# Patient Record
Sex: FEMALE | Race: WHITE
[De-identification: names, ages, dates, MRNs, and addresses within clinical notes are randomized per-mention and may not be internally consistent; named-entity substitution may affect disease eponyms.]

---

## 2018-12-31 ENCOUNTER — HOSPITAL ENCOUNTER (INPATIENT)
Dept: HOSPITAL 92 - ERS | Age: 78
LOS: 4 days | Discharge: NURSING, REHAB OR LONG-TERM CARE | DRG: 470 | End: 2019-01-04
Attending: SPECIALIST | Admitting: SPECIALIST
Payer: MEDICARE

## 2018-12-31 VITALS — BODY MASS INDEX: 26.4 KG/M2

## 2018-12-31 DIAGNOSIS — E83.39: ICD-10-CM

## 2018-12-31 DIAGNOSIS — Z85.038: ICD-10-CM

## 2018-12-31 DIAGNOSIS — E83.42: ICD-10-CM

## 2018-12-31 DIAGNOSIS — S72.002A: Primary | ICD-10-CM
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
## 2018-12-31 DIAGNOSIS — Z85.3: ICD-10-CM

## 2018-12-31 DIAGNOSIS — Z88.0: ICD-10-CM

## 2018-12-31 DIAGNOSIS — Z85.841: ICD-10-CM
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
## 2018-12-31 LAB
ALBUMIN SERPL BCG-MCNC: 4.1 G/DL (ref 3.4–4.8)
ALP SERPL-CCNC: 83 U/L (ref 40–150)
ALT SERPL W P-5'-P-CCNC: 36 U/L (ref 8–55)
ANION GAP SERPL CALC-SCNC: 14 MMOL/L (ref 10–20)
ANION GAP SERPL CALC-SCNC: 17 MMOL/L (ref 10–20)
APTT PPP: 28 SEC (ref 22.9–36.1)
AST SERPL-CCNC: 33 U/L (ref 5–34)
BASOPHILS # BLD AUTO: 0 THOU/UL (ref 0–0.2)
BASOPHILS # BLD AUTO: 0 THOU/UL (ref 0–0.2)
BASOPHILS NFR BLD AUTO: 0.1 % (ref 0–1)
BASOPHILS NFR BLD AUTO: 0.1 % (ref 0–1)
BILIRUB SERPL-MCNC: 0.5 MG/DL (ref 0.2–1.2)
BUN SERPL-MCNC: 17 MG/DL (ref 9.8–20.1)
BUN SERPL-MCNC: 18 MG/DL (ref 9.8–20.1)
CALCIUM SERPL-MCNC: 9.4 MG/DL (ref 7.8–10.44)
CALCIUM SERPL-MCNC: 9.7 MG/DL (ref 7.8–10.44)
CHLORIDE SERPL-SCNC: 101 MMOL/L (ref 98–107)
CHLORIDE SERPL-SCNC: 102 MMOL/L (ref 98–107)
CO2 SERPL-SCNC: 21 MMOL/L (ref 23–31)
CO2 SERPL-SCNC: 23 MMOL/L (ref 23–31)
CREAT CL PREDICTED SERPL C-G-VRATE: 0 ML/MIN (ref 70–130)
CREAT CL PREDICTED SERPL C-G-VRATE: 0 ML/MIN (ref 70–130)
EOSINOPHIL # BLD AUTO: 0 THOU/UL (ref 0–0.7)
EOSINOPHIL # BLD AUTO: 0 THOU/UL (ref 0–0.7)
EOSINOPHIL NFR BLD AUTO: 0.2 % (ref 0–10)
EOSINOPHIL NFR BLD AUTO: 0.3 % (ref 0–10)
GLOBULIN SER CALC-MCNC: 3.1 G/DL (ref 2.4–3.5)
GLUCOSE SERPL-MCNC: 106 MG/DL (ref 83–110)
GLUCOSE SERPL-MCNC: 114 MG/DL (ref 83–110)
HGB BLD-MCNC: 11.7 G/DL (ref 12–16)
HGB BLD-MCNC: 12.4 G/DL (ref 12–16)
INR PPP: 1
LYMPHOCYTES # BLD: 0.8 THOU/UL (ref 1.2–3.4)
LYMPHOCYTES # BLD: 1.1 THOU/UL (ref 1.2–3.4)
LYMPHOCYTES NFR BLD AUTO: 6.3 % (ref 21–51)
LYMPHOCYTES NFR BLD AUTO: 8.3 % (ref 21–51)
MAGNESIUM SERPL-MCNC: 2 MG/DL (ref 1.6–2.6)
MCH RBC QN AUTO: 30.3 PG (ref 27–31)
MCH RBC QN AUTO: 30.6 PG (ref 27–31)
MCV RBC AUTO: 91.5 FL (ref 78–98)
MCV RBC AUTO: 92.4 FL (ref 78–98)
MONOCYTES # BLD AUTO: 0.7 THOU/UL (ref 0.11–0.59)
MONOCYTES # BLD AUTO: 0.8 THOU/UL (ref 0.11–0.59)
MONOCYTES NFR BLD AUTO: 5.4 % (ref 0–10)
MONOCYTES NFR BLD AUTO: 6.4 % (ref 0–10)
NEUTROPHILS # BLD AUTO: 10.7 THOU/UL (ref 1.4–6.5)
NEUTROPHILS # BLD AUTO: 11.7 THOU/UL (ref 1.4–6.5)
NEUTROPHILS NFR BLD AUTO: 84.8 % (ref 42–75)
NEUTROPHILS NFR BLD AUTO: 88.1 % (ref 42–75)
PLATELET # BLD AUTO: 174 THOU/UL (ref 130–400)
PLATELET # BLD AUTO: 177 THOU/UL (ref 130–400)
POTASSIUM SERPL-SCNC: 4.3 MMOL/L (ref 3.5–5.1)
POTASSIUM SERPL-SCNC: 4.5 MMOL/L (ref 3.5–5.1)
PROTHROMBIN TIME: 13.3 SEC (ref 12–14.7)
RBC # BLD AUTO: 3.85 MILL/UL (ref 4.2–5.4)
RBC # BLD AUTO: 4.07 MILL/UL (ref 4.2–5.4)
SODIUM SERPL-SCNC: 134 MMOL/L (ref 136–145)
SODIUM SERPL-SCNC: 135 MMOL/L (ref 136–145)
SP GR UR STRIP: 1.02 (ref 1–1.04)
WBC # BLD AUTO: 12.6 THOU/UL (ref 4.8–10.8)
WBC # BLD AUTO: 13.3 THOU/UL (ref 4.8–10.8)

## 2018-12-31 PROCEDURE — 96375 TX/PRO/DX INJ NEW DRUG ADDON: CPT

## 2018-12-31 PROCEDURE — 88305 TISSUE EXAM BY PATHOLOGIST: CPT

## 2018-12-31 PROCEDURE — 36415 COLL VENOUS BLD VENIPUNCTURE: CPT

## 2018-12-31 PROCEDURE — 86900 BLOOD TYPING SEROLOGIC ABO: CPT

## 2018-12-31 PROCEDURE — 83735 ASSAY OF MAGNESIUM: CPT

## 2018-12-31 PROCEDURE — 72170 X-RAY EXAM OF PELVIS: CPT

## 2018-12-31 PROCEDURE — 96361 HYDRATE IV INFUSION ADD-ON: CPT

## 2018-12-31 PROCEDURE — 96374 THER/PROPH/DIAG INJ IV PUSH: CPT

## 2018-12-31 PROCEDURE — 85610 PROTHROMBIN TIME: CPT

## 2018-12-31 PROCEDURE — 84100 ASSAY OF PHOSPHORUS: CPT

## 2018-12-31 PROCEDURE — 80053 COMPREHEN METABOLIC PANEL: CPT

## 2018-12-31 PROCEDURE — 86901 BLOOD TYPING SEROLOGIC RH(D): CPT

## 2018-12-31 PROCEDURE — 85730 THROMBOPLASTIN TIME PARTIAL: CPT

## 2018-12-31 PROCEDURE — 81003 URINALYSIS AUTO W/O SCOPE: CPT

## 2018-12-31 PROCEDURE — G0390 TRAUMA RESPONS W/HOSP CRITI: HCPCS

## 2018-12-31 PROCEDURE — 85025 COMPLETE CBC W/AUTO DIFF WBC: CPT

## 2018-12-31 PROCEDURE — 80048 BASIC METABOLIC PNL TOTAL CA: CPT

## 2018-12-31 PROCEDURE — 93005 ELECTROCARDIOGRAM TRACING: CPT

## 2018-12-31 PROCEDURE — 88311 DECALCIFY TISSUE: CPT

## 2018-12-31 PROCEDURE — 86850 RBC ANTIBODY SCREEN: CPT

## 2018-12-31 NOTE — HP
ATTENDING SURGEON:  Dr. Atkins.



CONSULTATION:  Orthopedics, Dr. Abdalla.



HISTORY OF PRESENT ILLNESS:  The patient is a 78-year-old  woman, who was

transferred here from our facility from [facility], where she

reportedly had a fall at the facility she lives in.  The patient denies any loss of

consciousness.  She was brought to the emergency department there and underwent

evaluation, was noted to have a left hip fracture.  At which time, she was

transferred to our facility for admission and orthopedic examination. 



ALLERGIES:  PENICILLIN.



CURRENT MEDICATIONS:  

1. Calcium.

2. Loperamide.

3. Zofran.

4. Ibuprofen.



PAST MEDICAL HISTORY:  Breast, colon, and brain cancer.



PAST SURGICAL HISTORY:  Colon resection x2, lumpectomy on the left, and

cholecystectomy. 



SOCIAL HISTORY:  The patient lives in an assisted living facility.  There is no

history of drug, tobacco, or alcohol use. 



REVIEW OF SYSTEMS:  A 10-point review of systems is negative except as otherwise

stated. 



PHYSICAL EXAMINATION:

VITAL SIGNS:  Blood pressure 152/69, heart rate 102, respirations 17, oxygen

saturation 93% on room air, and temperature is 99.3. 

GENERAL:  The patient is resting comfortably in bed.  She is awake, alert, and

oriented x2.  The patient does seem to have some confusion, but is easily redirected

regarding time and location. 

HEENT:  Normocephalic and atraumatic.  Eyes, extraocular movement intact.  PERRLA

bilaterally.  Ears are atraumatic without discharge.  Nose is atraumatic without

discharge.  Oropharynx is clear. 

NECK:  Nontender.  Trachea is in midline.  No JVD. 

CHEST:  Clear to auscultation with good inspiratory and expiratory effort. 

HEART:  Regular rate and rhythm. 

ABDOMEN:  Soft, flat, nontender with active bowel sounds. 

PELVIS:  Stable with tenderness to the left hip, consistent with her fracture. 

EXTREMITIES:   Neurovascularly intact x4.  The patient does have a small contusion

noted to her lateral left knee. 

BACK:  Atraumatic and nontender.



LABORATORY EXAMINATION:  White blood cell count 13.3, hemoglobin 12.4, hematocrit

37.6, and platelets 177.  Urinalysis is unremarkable.  The remaining labs are still

pending. 



RADIOGRAPHIC FINDINGS:  AP pelvis shows an impacted subcapital left femoral neck

fracture.  Views of the left hip again demonstrate impacted left hip fracture. 



ASSESSMENT:  

1. Status post fall.

2. Left hip fracture.

3. History of brain, colon, and breast cancer.



PLAN:  Plan will be to admit the patient to the surgical floor.  She will be made

n.p.o. after midnight for plans of surgical intervention tomorrow.  The patient will

have pain control, pulmonary toilet, gastritis and mechanical VTE prophylaxis.  The

evaluation, examination, and laboratory findings will be discussed with Dr. Atkins

after this dictation.  The patient was evaluated in the emergency department by Dr. Abdalla. 







Job ID:  [id number]

## 2018-12-31 NOTE — RAD
LEFT HIP TWO VIEWS:

 

HISTORY:

Left hip pain.

 

FINDINGS:

Impaction at a subcapital fracture with minimal superior displacement of the major distal fragment.  
Mild degenerative changes of the hip.

 

IMPRESSION:

Impacted left hip fracture.

 

POS: KI

## 2018-12-31 NOTE — RAD
AP PELVIS ONE VIEW:

 

HISTORY:

Fall.  Pelvic pain.

 

FINDINGS:

Left subcapital hip fracture again demonstrated.  Sacral ala and pelvic rings are intact.  Mild degen
erative changes of the right hip and lumbar spine.

 

IMPRESSION:

Impacted subcapital fracture, left hip.

 

POS: KI

## 2019-01-01 LAB
ANION GAP SERPL CALC-SCNC: 13 MMOL/L (ref 10–20)
BASOPHILS # BLD AUTO: 0 THOU/UL (ref 0–0.2)
BASOPHILS NFR BLD AUTO: 0.5 % (ref 0–1)
BUN SERPL-MCNC: 13 MG/DL (ref 9.8–20.1)
CALCIUM SERPL-MCNC: 9 MG/DL (ref 7.8–10.44)
CHLORIDE SERPL-SCNC: 104 MMOL/L (ref 98–107)
CO2 SERPL-SCNC: 24 MMOL/L (ref 23–31)
CREAT CL PREDICTED SERPL C-G-VRATE: 68 ML/MIN (ref 70–130)
EOSINOPHIL # BLD AUTO: 0.1 THOU/UL (ref 0–0.7)
EOSINOPHIL NFR BLD AUTO: 0.9 % (ref 0–10)
GLUCOSE SERPL-MCNC: 102 MG/DL (ref 83–110)
HGB BLD-MCNC: 11.3 G/DL (ref 12–16)
LYMPHOCYTES # BLD: 0.9 THOU/UL (ref 1.2–3.4)
LYMPHOCYTES NFR BLD AUTO: 13.1 % (ref 21–51)
MAGNESIUM SERPL-MCNC: 2.2 MG/DL (ref 1.6–2.6)
MCH RBC QN AUTO: 30.3 PG (ref 27–31)
MCV RBC AUTO: 92.2 FL (ref 78–98)
MONOCYTES # BLD AUTO: 0.5 THOU/UL (ref 0.11–0.59)
MONOCYTES NFR BLD AUTO: 6.8 % (ref 0–10)
NEUTROPHILS # BLD AUTO: 5.6 THOU/UL (ref 1.4–6.5)
NEUTROPHILS NFR BLD AUTO: 78.8 % (ref 42–75)
PLATELET # BLD AUTO: 154 THOU/UL (ref 130–400)
POTASSIUM SERPL-SCNC: 4.1 MMOL/L (ref 3.5–5.1)
RBC # BLD AUTO: 3.74 MILL/UL (ref 4.2–5.4)
SODIUM SERPL-SCNC: 137 MMOL/L (ref 136–145)
WBC # BLD AUTO: 7.1 THOU/UL (ref 4.8–10.8)

## 2019-01-01 PROCEDURE — 0SRS01Z REPLACEMENT OF LEFT HIP JOINT, FEMORAL SURFACE WITH METAL SYNTHETIC SUBSTITUTE, OPEN APPROACH: ICD-10-PCS | Performed by: ORTHOPAEDIC SURGERY

## 2019-01-01 RX ADMIN — CLINDAMYCIN PHOSPHATE SCH MLS: 900 INJECTION, SOLUTION INTRAVENOUS at 17:35

## 2019-01-01 NOTE — RAD
LEFT HIP 2 VIEWS:

 

HISTORY: 

Left hip fracture.

 

FINDINGS: 

Total left hip prosthesis is in place without perihardware lucency.  Skin staples overlie the incisio
n.

 

IMPRESSION: 

Left hip prosthesis is in good radiographic position.

 

POS: KI

## 2019-01-01 NOTE — CON
DATE OF CONSULTATION:  



BRIEF HISTORY OF PRESENT ILLNESS:  The patient is a 78-year-old lady who earlier

this evening fell at the care facility where she lives.  She reports she was just

trying to get into bed when she slipped, fell, injuring her left hip.  She was seen

and evaluated at the [facility] in [place] where x-rays demonstrated

impacted subcapital femoral neck fracture of the left hip.  As such, she now is

transferred to [place] and admitted to the trauma service for further care. 



PAST MEDICAL HISTORY:  Remarkable for breast cancer, colon cancer, as well as brain

cancer. 



PAST SURGICAL HISTORY:  Includes colon resection x2, lumpectomy left breast, as well

as cholecystectomy. 



MEDICATIONS:  Include calcium, loperamide, Zofran, and ibuprofen.



ALLERGIES:  TO PENICILLIN.  THIS IS A CHILDHOOD ALLERGY WITH SOME SWELLING AND

SHORTNESS OF BREATH. 



SOCIAL HISTORY:  She lives in assisted living facility.  She denies alcohol,

tobacco, or drug use. 



FAMILY HISTORY:  Noncontributory.



REVIEW OF SYSTEMS:  Denies recent fevers, chills, or sweats.  Denies chest pain or

shortness of breath.  Denies numbness or tingling in the lower extremities. 



PHYSICAL EXAMINATION:

VITAL SIGNS:  She is found to have temperature 98.8, heart rate of 85, respiratory

rate of 16, and blood pressure 122/75. 

GENERAL:  The patient is found to be awake, alert, although does become easily

confused. 

HEENT:  Atraumatic and normocephalic. 

HEART:  Shows a regular rate and rhythm without murmur. 

CHEST:  Clear to auscultation with good breath sounds bilaterally. 

ABDOMEN:  Flat, soft with normal bowel sounds and scars from her prior surgeries. 

PELVIS:  Stable. 

EXTREMITIES:  Remarkable for a left lower extremity that is just held in slight

external rotation at the hip, but there is no gross shortening.  She has pain with

any type of log-rolling or movement of the leg whatsoever and the pain is felt at

the groin.  The knee, ankle and foot appear atraumatic.  She is moving her toes

normally.  Has intact subjective sensation over the dorsal and plantar aspects of

the foot. 



LABORATORY DATA:  Labs found to have a white count of 13, hematocrit of 37.6,

177,000 platelets.  She has an INR of 1.0.  Her basic metabolic panel is roughly

within normal limits with the exception of a slightly low sodium of 134. 



X-rays of AP pelvis as well as two view left hip remarkable for an impacted

subcapital femoral neck fracture with some varus alignment. 



ASSESSMENT:  The patient is a 78-year-old status post fall sustaining left femoral

neck fracture with comorbidities including history of brain, colon and breast

cancer. 



PLAN:  At this time, the patient will be admitted to the surgical floor on the

Trauma Service.  She is n.p.o.  We will plan on proceeding with a left hip

hemiarthroplasty with plans to send the femoral head to pathology to rule out

metastatic pathologic cancer.  Today I discussed with the patient risks and benefits

of surgery.  These include, but are not limited to bleeding, infection, nerve

injury, DVT, PE, loss of limb or life.  We also discussed risks of dislocation

following this hemiarthroplasty procedure.  She appears to understand and does wish

to proceed.  Consent will be obtained prior to surgery. 







Job ID:  [id number]

## 2019-01-01 NOTE — OP
DATE OF PROCEDURE:  01/01/2019



PREOPERATIVE DIAGNOSIS:  Left subcapital femoral neck fracture.



POSTOPERATIVE DIAGNOSIS:  Left subcapital femoral neck fracture.



PROCEDURE PERFORMED:  Left hip hemiarthroplasty.



ANESTHESIA:  General.



ASSISTANT:  Erasmo.



ESTIMATED BLOOD LOSS:  350 mL.



IMPLANTS:  DePuy Gila size 4 stem was used with an ARTICUL/SEVEN +5 head and a

bipolar 28 x 44 mm cup. 



SPECIMEN:  Femoral head, sent to Pathology for gross and micro given history of

extensive cancer diagnosis. 



DRAINS:  None.



COMPLICATIONS:  None.



OUTCOME:  Stable hemiarthroplasty.



INDICATION:  The patient is a 78-year-old lady, status post ground level fall,

sustaining a left subcapital femoral neck fracture.  After discussion with the

patient and her family, we have decided to proceed with hemiarthroplasty.  Risks and

benefits have been discussed with the patient.  Risks include, but are not limited

to bleeding, infection, nerve injury, DVT, PE, dislocation, loss of limb or life.

They appear to understand and do wish to proceed.  Consent has been obtained. 



DESCRIPTION OF PROCEDURE:  The patient was brought to the operating room and a

time-out was performed followed by induction of general anesthesia.  Next, the

patient was positioned in a right lateral decubitus position on the operating room

table and a sterile prep and drape performed of the left lower extremity.  Next, a

curvilinear incision was made, centered over the greater trochanter after skin was

sharply incised.  Dissection carried down through the subcutaneous fat to the

underlying fascia cheli and tensor fascia.  This structure was incised in line with

the skin incision and reflected anteriorly and posteriorly with a Charnley

retractor.  The trochanteric bursa was swept off the short external rotators and

then elevator was passed under the abductors.  The piriformis, superior and inferior

gemelli were divided off the posterior aspect of the proximal femur, tagged and then

reflected posteriorly gaining access to the posterior capsule.  A T-capsulotomy was

then performed with leaflets of the capsule tagged for eventual repair.  At this

point, a T-handle awl was inserted in the femoral head and the femoral head was

removed without difficulty, sized to size 44.  Next, an oscillating saw was used to

make a femoral neck cut.  This was followed by box chisel and then T-handle awl and

lateralizing reamer.  Progressive T-handle awls were passed down the canal up to a

size 4.  Next, broaching was started at size 1 and continued up to size 4, which

gave good fit and fill.  At this point, trial reductions were performed and a +5

head was found to give good stability and was felt to be clinically equal leg

lengths.  As such, the trial broach was then removed.  The proximal femur thoroughly

irrigated with normal saline with Pulsavac and then the final 4 stem was introduced

in the proximal femur followed by insertion of the bipolar head.  The hip was

reduced, found to have excellent stability.  Next, the capsule was reapproximated

with #2 Vicryl.  This was followed by reapproximation of the short external rotators

with #2 Vicryl, #2 Vicryl also used for the tensor fascia and fascia cheli followed

by 0 Vicryl for the Aracely fascia, 2-0 Vicryl subcutaneously, and staples for the

skin.  Xeroform gauze and tape dressing were applied to the thigh and then the

patient was transferred to recovery room in stable condition.  There were no

complications.  The patient tolerated the procedure well. 







Job ID:  [id number]

## 2019-01-01 NOTE — PRG
DATE OF SERVICE:  01/01/2019



SUBJECTIVE:  The patient is hospital day #2, here on the surgical floor.  She was

admitted for ground level fall, where she sustained a left hip fracture.  She is

scheduled to undergo surgical repair today by Dr. Abdalla.  The patient has been

n.p.o. after midnight.  Her pain was controlled overnight.  The patient has no new

complaints. 



OBJECTIVE:  VITAL SIGNS:  Temperature is 98.2, heart rate 87, respirations 12,

oxygen saturation 92% on room air, blood pressure 131/76. 

GENERAL:  The patient is resting comfortably in bed.  She is sleeping, but easily

awakens to verbal stimuli.  She is appropriate, though somewhat disoriented, but is

able to be redirected. 

HEENT:  Unremarkable. 

LUNGS:  Clear to auscultation with good inspiratory and expiratory effort. 

HEART:  Regular rate and rhythm. 

ABDOMEN:  Soft, flat, nontender with hypoactive bowel sounds. 

EXTREMITIES:  Neurovascularly intact x4.



LABORATORY FINDINGS:  White blood cell count 7.1, hemoglobin 11.3, hematocrit 34.4,

platelets 154.  Sodium 137, potassium 4.1, chloride 104, CO2 of 24, BUN 13,

creatinine 0.80, glucose 120, magnesium 2.2, phosphorus 3.7. 



There are no radiographs to review this morning.



ASSESSMENT:  

1. Status post ground level fall.

2. Left hip fracture.



PLAN:  Plan will be to start physical and occupational therapies after surgery.

Once the patient is tolerating diet, we will change her pain medications to p.o. and

then tomorrow, we will discuss placement with the patient. 







Job ID:  [id number]

## 2019-01-02 LAB
ANION GAP SERPL CALC-SCNC: 11 MMOL/L (ref 10–20)
BASOPHILS # BLD AUTO: 0 THOU/UL (ref 0–0.2)
BASOPHILS NFR BLD AUTO: 0.3 % (ref 0–1)
BUN SERPL-MCNC: 12 MG/DL (ref 9.8–20.1)
CALCIUM SERPL-MCNC: 8.6 MG/DL (ref 7.8–10.44)
CHLORIDE SERPL-SCNC: 107 MMOL/L (ref 98–107)
CO2 SERPL-SCNC: 22 MMOL/L (ref 23–31)
CREAT CL PREDICTED SERPL C-G-VRATE: 73 ML/MIN (ref 70–130)
EOSINOPHIL # BLD AUTO: 0.1 THOU/UL (ref 0–0.7)
EOSINOPHIL NFR BLD AUTO: 1.1 % (ref 0–10)
GLUCOSE SERPL-MCNC: 111 MG/DL (ref 83–110)
HGB BLD-MCNC: 10 G/DL (ref 12–16)
LYMPHOCYTES # BLD: 0.9 THOU/UL (ref 1.2–3.4)
LYMPHOCYTES NFR BLD AUTO: 11.8 % (ref 21–51)
MAGNESIUM SERPL-MCNC: 1.9 MG/DL (ref 1.6–2.6)
MCH RBC QN AUTO: 30.2 PG (ref 27–31)
MCV RBC AUTO: 93.3 FL (ref 78–98)
MONOCYTES # BLD AUTO: 0.6 THOU/UL (ref 0.11–0.59)
MONOCYTES NFR BLD AUTO: 8.1 % (ref 0–10)
NEUTROPHILS # BLD AUTO: 5.7 THOU/UL (ref 1.4–6.5)
NEUTROPHILS NFR BLD AUTO: 78.7 % (ref 42–75)
PLATELET # BLD AUTO: 145 THOU/UL (ref 130–400)
POTASSIUM SERPL-SCNC: 4 MMOL/L (ref 3.5–5.1)
RBC # BLD AUTO: 3.32 MILL/UL (ref 4.2–5.4)
SODIUM SERPL-SCNC: 136 MMOL/L (ref 136–145)
WBC # BLD AUTO: 7.2 THOU/UL (ref 4.8–10.8)

## 2019-01-02 RX ADMIN — ASPIRIN SCH MG: 81 TABLET ORAL at 07:46

## 2019-01-02 RX ADMIN — ASPIRIN SCH MG: 81 TABLET ORAL at 20:44

## 2019-01-02 RX ADMIN — CLINDAMYCIN PHOSPHATE SCH MLS: 900 INJECTION, SOLUTION INTRAVENOUS at 01:49

## 2019-01-02 RX ADMIN — DOCUSATE SODIUM 50 MG AND SENNOSIDES 8.6 MG SCH TAB: 8.6; 5 TABLET, FILM COATED ORAL at 20:44

## 2019-01-02 RX ADMIN — CLINDAMYCIN PHOSPHATE SCH MLS: 900 INJECTION, SOLUTION INTRAVENOUS at 07:47

## 2019-01-02 NOTE — PRG
DATE OF SERVICE:  01/02/2019



SUBJECTIVE:  Ms. Mendoza is a 78-year-old woman, who suffered closed left hip

fracture following a fall 2 days previously. 



She is postoperative day #1 status post left hip hemiarthroplasty. 



This morning, she reports adequate pain control. 



She has poor oral intake overnight. 



Her blood pressure was noted to be low this morning, although the patient denies any

syncope, dyspnea or chest pain. 



OBJECTIVE:  VITAL SIGNS:  This morning include a blood pressure of 99/64, pulse 93,

respiratory rate is 16, temperature is 98.2 degrees Fahrenheit, and oxygen

saturation is 95% on room air. 

HEENT:  Reveals normocephalic and atraumatic.  Pupils are equal, round, and reactive

to light and accommodation.  Extraocular muscles are intact bilaterally.  No sclerae

icterus are present.  Oral mucosa is pink but dry. 

NECK:  Supple.  No palpable lymphadenopathy or thyromegaly present.  She has no

jugular venous distention noted. 

HEART:  Reveals regular rate and rhythm.  No murmurs or gallops auscultated. 

LUNGS:  Clear to auscultation bilaterally.  Her breathing was nonlabored. 

ABDOMEN:  Soft, nontender, and nondistended. 

EXTREMITIES:  Reveal 2+ radial and pedal pulses bilaterally.  No ankle edema is

present. 

NEUROLOGIC:  Reveals no focal deficits present.



LABORATORY FINDINGS:  Today include a CBC with 7200 white blood cells, hemoglobin

and hematocrit 10.0 and 31.0 respectively, in contrast to 11.3 and 34.4

preoperatively.  Platelet count is stable at 145,000. 



Metabolic profile; sodium is 136, potassium is 4.0, chloride is 107, bicarb is 22,

BUN and creatinine of 12 and 0.75 respectively, and glucose is 111. 



Phosphorus 2.5, magnesium is 1.9.



IMPRESSION:  

1. Postoperative day #1, status post left hip hemiarthroplasty, hemodynamically

stable. 

2. Acute hypomagnesemia.

3. Acute hypophosphatemia.



PLAN:  

1. The patient was given a bolus of normal saline 500 mL intravenously.  Repeat

blood pressure improved to 109/66. 

2. We will initiate physical and occupational therapy.

3. Anticipate discharge to inpatient rehabilitation within the next 24 to 48 hours.

Pending insurance authorization. 

4. The above findings and plan have been discussed with the patient and her daughter

at bedside. 

5. Continue to follow the patient's urinary output as endpoint of resuscitation.







Job ID:  [id number]

## 2019-01-03 RX ADMIN — ASPIRIN SCH MG: 81 TABLET ORAL at 08:53

## 2019-01-03 RX ADMIN — DOCUSATE SODIUM 50 MG AND SENNOSIDES 8.6 MG SCH TAB: 8.6; 5 TABLET, FILM COATED ORAL at 20:53

## 2019-01-03 RX ADMIN — DOCUSATE SODIUM 50 MG AND SENNOSIDES 8.6 MG SCH TAB: 8.6; 5 TABLET, FILM COATED ORAL at 08:53

## 2019-01-03 RX ADMIN — ASPIRIN SCH MG: 81 TABLET ORAL at 20:52

## 2019-01-03 NOTE — PRG
DATE OF SERVICE:  01/03/2019



The patient was seen with Dr. Baron Montana.



SUBJECTIVE:  Ms. Mendoza is postop day #2, status post ORIF of the left hip.  She

is found sitting up in bed, states that she has 10/10 pain.  Currently, her blood

pressure was noted to be low the day before, however, has improved today and seems

to be stable.  The patient is also awaiting rehab placement in [place].  I discussed

this with Case Management. 



OBJECTIVE:  VITAL SIGNS:  Temperature is 98.4, blood pressure is 129/78, heart rate

is 84, breathing 14 times per minute.  She is 94% on room air. 

GENERAL:  This is a 78-year-old female, sitting up on the edge of the bed, appears

to be in slight pain. 

HEENT:  Normocephalic and atraumatic. 

RESPIRATORY:  Equal rise and fall.  No respiratory distress. 

CARDIOVASCULAR:  Regular rhythm. 

ABDOMEN:  Soft and nondistended. 

EXTREMITIES:  She is able to move all of her extremities, warm.  No edema. 

NEUROLOGIC:  Seems to be alert and oriented. 

PSYCHIATRIC:  A little bit withdrawn this morning.



LABORATORY DATA:  There is no laboratory data to review from today.  A pathology

report from her hip is pending. 



ASSESSMENT:  

1. Postop day #2, status post left hip hemiarthroplasty.

2. Acute hypomagnesemia, on a diet.

3. Acute hypophosphatemia, currently on a diet.



PLAN:  

1. Discontinue the Grover catheter.

2. Continue to work with PT.

3. Scheduled tramadol 50 mg every 6 and then continue p.r.n. pain control.

4. Increase trauma bowel regimen as the patient has not stooled yet since arrival.

5. Continue all other supportive care.

6. Follow up pathology.

7. Hope for discharge to skilled nursing rehab in 7 days.  We discussed the case

with Case Management and waiting for insurance authorization. 







Job ID:  [id number]

## 2019-01-04 VITALS — DIASTOLIC BLOOD PRESSURE: 78 MMHG | TEMPERATURE: 97.6 F | SYSTOLIC BLOOD PRESSURE: 125 MMHG

## 2019-01-04 RX ADMIN — ASPIRIN SCH MG: 81 TABLET ORAL at 08:02

## 2019-01-04 RX ADMIN — DOCUSATE SODIUM 50 MG AND SENNOSIDES 8.6 MG SCH TAB: 8.6; 5 TABLET, FILM COATED ORAL at 08:03

## 2019-01-06 NOTE — DIS
DATE OF ADMISSION:  12/31/2018



DATE OF DISCHARGE:  01/04/2019



DISCHARGE SURGEON:  Dr. Montana.



CONSULT:  Orthopedics, Dr. Abdalla.



PROCEDURES:  

1. On 12/31/2018, hip x-ray; impression, impacted left hip fracture.

2. On 12/31/2018, pelvis x-ray; impression, impacted subcapital fracture, left hip.



PRIMARY DIAGNOSIS:  Impacted left hip fracture.



SECONDARY DIAGNOSES:  History of brain, colon, and breast cancer.



DISCHARGE MEDICATIONS:  

1. Acetaminophen 1000 mg p.o. q.6 hours.

2. Aspirin 81 mg p.o. b.i.d.

3. Calcium carbonate 500 mg p.o. q.i.d.

4. Colace 100 mg p.o. b.i.d.

5. Folic acid, multivitamin, Centrum Silver Chewable one tablet daily.

6. Ibuprofen 200 mg p.o. q.6 hours.

7. Advil PM Liqui-Gels two tablets p.o. h.s. as needed for insomnia.

8. Imodium 2 mg p.o. as needed.

9. Zofran ODT 4 mg tab as needed.

10. MiraLAX 17 g p.o. daily.

11. Senokot 2 tabs p.o. b.i.d.

12. Tramadol 50 mg p.o. q.4 hours as needed for severe pain.



HISTORY OF PRESENT ILLNESS:  This is a 78-year-old female, who was transferred from

[facility], where she reportedly had a fall at the facility she lives

in. The patient denies any loss of consciousness. She was brought to the emergency

room there and underwent evaluation and was noted to have a left hip fracture. At

that time, she was transferred to [facility] for admission and

orthopedic evaluation. The patient did have decreased appetite during the hospital

stay and had an episode of low blood pressure with no symptoms with the lower blood

pressure. Otherwise, the patient's pain was controlled during her hospital stay. On

the day of discharge, the patient was seen and evaluated with Dr. Baron Montana. The

patient had no complaints nor did the family. The patient's vital signs were stable

on the day of discharge and exam was unremarkable including cardiopulmonary and GI

exam. The patient was deemed stable for discharge to [facility] for

continued physical therapy and occupational therapy. 



DISPOSITION:  Stable.



DISCHARGE INSTRUCTIONS:  

1. Location: [facility].

2. Diet: Regular diet.

3. Activity: Orthopedic limitations. Weightbearing as tolerated and hip precautions.

4. Followup: Follow up with  __________ as needed. Follow up with Dr. Montana as

needed. Follow up Dr. Abdalla. Call to schedule appointment. 







Job ID:  [id number]